# Patient Record
Sex: FEMALE | Race: WHITE | NOT HISPANIC OR LATINO | Employment: FULL TIME | URBAN - METROPOLITAN AREA
[De-identification: names, ages, dates, MRNs, and addresses within clinical notes are randomized per-mention and may not be internally consistent; named-entity substitution may affect disease eponyms.]

---

## 2024-08-28 ENCOUNTER — APPOINTMENT (OUTPATIENT)
Dept: CARDIOLOGY | Facility: CLINIC | Age: 59
End: 2024-08-28
Attending: HOSPITALIST
Payer: COMMERCIAL

## 2024-08-28 ENCOUNTER — HOSPITAL ENCOUNTER (OUTPATIENT)
Facility: CLINIC | Age: 59
Setting detail: OBSERVATION
Discharge: HOME | End: 2024-08-29
Attending: EMERGENCY MEDICINE | Admitting: EMERGENCY MEDICINE
Payer: COMMERCIAL

## 2024-08-28 ENCOUNTER — APPOINTMENT (OUTPATIENT)
Dept: ULTRASOUND IMAGING | Facility: CLINIC | Age: 59
End: 2024-08-28
Attending: HOSPITALIST
Payer: COMMERCIAL

## 2024-08-28 ENCOUNTER — APPOINTMENT (OUTPATIENT)
Dept: CT IMAGING | Facility: CLINIC | Age: 59
End: 2024-08-28
Attending: EMERGENCY MEDICINE
Payer: COMMERCIAL

## 2024-08-28 DIAGNOSIS — I26.94 MULTIPLE SUBSEGMENTAL PULMONARY EMBOLI WITHOUT ACUTE COR PULMONALE (H): ICD-10-CM

## 2024-08-28 LAB
ANION GAP SERPL CALCULATED.3IONS-SCNC: 13 MMOL/L (ref 7–15)
ATRIAL RATE - MUSE: 81 BPM
BASOPHILS # BLD AUTO: 0 10E3/UL (ref 0–0.2)
BASOPHILS NFR BLD AUTO: 1 %
BUN SERPL-MCNC: 15.3 MG/DL (ref 8–23)
CALCIUM SERPL-MCNC: 9.4 MG/DL (ref 8.8–10.4)
CHLORIDE SERPL-SCNC: 101 MMOL/L (ref 98–107)
CREAT SERPL-MCNC: 1.2 MG/DL (ref 0.51–0.95)
D DIMER PPP FEU-MCNC: 3.95 UG/ML FEU (ref 0–0.5)
DIASTOLIC BLOOD PRESSURE - MUSE: NORMAL MMHG
EGFRCR SERPLBLD CKD-EPI 2021: 51 ML/MIN/1.73M2
EOSINOPHIL # BLD AUTO: 0.2 10E3/UL (ref 0–0.7)
EOSINOPHIL NFR BLD AUTO: 4 %
ERYTHROCYTE [DISTWIDTH] IN BLOOD BY AUTOMATED COUNT: 12.3 % (ref 10–15)
FLUAV RNA SPEC QL NAA+PROBE: NEGATIVE
FLUBV RNA RESP QL NAA+PROBE: NEGATIVE
GLUCOSE SERPL-MCNC: 112 MG/DL (ref 70–99)
HCO3 SERPL-SCNC: 25 MMOL/L (ref 22–29)
HCT VFR BLD AUTO: 44.7 % (ref 35–47)
HGB BLD-MCNC: 15 G/DL (ref 11.7–15.7)
IMM GRANULOCYTES # BLD: 0 10E3/UL
IMM GRANULOCYTES NFR BLD: 0 %
INTERPRETATION ECG - MUSE: NORMAL
LVEF ECHO: NORMAL
LYMPHOCYTES # BLD AUTO: 1.3 10E3/UL (ref 0.8–5.3)
LYMPHOCYTES NFR BLD AUTO: 20 %
MCH RBC QN AUTO: 30.7 PG (ref 26.5–33)
MCHC RBC AUTO-ENTMCNC: 33.6 G/DL (ref 31.5–36.5)
MCV RBC AUTO: 91 FL (ref 78–100)
MONOCYTES # BLD AUTO: 0.6 10E3/UL (ref 0–1.3)
MONOCYTES NFR BLD AUTO: 9 %
NEUTROPHILS # BLD AUTO: 4.4 10E3/UL (ref 1.6–8.3)
NEUTROPHILS NFR BLD AUTO: 67 %
NRBC # BLD AUTO: 0 10E3/UL
NRBC BLD AUTO-RTO: 0 /100
NT-PROBNP SERPL-MCNC: 121 PG/ML (ref 0–900)
P AXIS - MUSE: 49 DEGREES
PLATELET # BLD AUTO: 152 10E3/UL (ref 150–450)
POTASSIUM SERPL-SCNC: 3.4 MMOL/L (ref 3.4–5.3)
PR INTERVAL - MUSE: 148 MS
QRS DURATION - MUSE: 90 MS
QT - MUSE: 410 MS
QTC - MUSE: 476 MS
R AXIS - MUSE: -5 DEGREES
RADIOLOGIST FLAGS: ABNORMAL
RBC # BLD AUTO: 4.89 10E6/UL (ref 3.8–5.2)
RSV RNA SPEC NAA+PROBE: NEGATIVE
SARS-COV-2 RNA RESP QL NAA+PROBE: NEGATIVE
SODIUM SERPL-SCNC: 139 MMOL/L (ref 135–145)
SYSTOLIC BLOOD PRESSURE - MUSE: NORMAL MMHG
T AXIS - MUSE: 1 DEGREES
TROPONIN T SERPL HS-MCNC: 8 NG/L
UFH PPP CHRO-ACNC: 0.9 IU/ML
VENTRICULAR RATE- MUSE: 81 BPM
WBC # BLD AUTO: 6.6 10E3/UL (ref 4–11)

## 2024-08-28 PROCEDURE — 250N000011 HC RX IP 250 OP 636: Performed by: EMERGENCY MEDICINE

## 2024-08-28 PROCEDURE — 99223 1ST HOSP IP/OBS HIGH 75: CPT | Performed by: HOSPITALIST

## 2024-08-28 PROCEDURE — 36415 COLL VENOUS BLD VENIPUNCTURE: CPT | Performed by: HOSPITALIST

## 2024-08-28 PROCEDURE — 96365 THER/PROPH/DIAG IV INF INIT: CPT

## 2024-08-28 PROCEDURE — 71275 CT ANGIOGRAPHY CHEST: CPT

## 2024-08-28 PROCEDURE — G0378 HOSPITAL OBSERVATION PER HR: HCPCS

## 2024-08-28 PROCEDURE — 93970 EXTREMITY STUDY: CPT

## 2024-08-28 PROCEDURE — 85379 FIBRIN DEGRADATION QUANT: CPT | Performed by: EMERGENCY MEDICINE

## 2024-08-28 PROCEDURE — 36415 COLL VENOUS BLD VENIPUNCTURE: CPT | Performed by: EMERGENCY MEDICINE

## 2024-08-28 PROCEDURE — 96366 THER/PROPH/DIAG IV INF ADDON: CPT

## 2024-08-28 PROCEDURE — C8929 TTE W OR WO FOL WCON,DOPPLER: HCPCS

## 2024-08-28 PROCEDURE — 250N000011 HC RX IP 250 OP 636: Performed by: HOSPITALIST

## 2024-08-28 PROCEDURE — 99285 EMERGENCY DEPT VISIT HI MDM: CPT | Mod: 25

## 2024-08-28 PROCEDURE — 83880 ASSAY OF NATRIURETIC PEPTIDE: CPT | Performed by: EMERGENCY MEDICINE

## 2024-08-28 PROCEDURE — 93005 ELECTROCARDIOGRAM TRACING: CPT

## 2024-08-28 PROCEDURE — 85025 COMPLETE CBC W/AUTO DIFF WBC: CPT | Performed by: EMERGENCY MEDICINE

## 2024-08-28 PROCEDURE — 80048 BASIC METABOLIC PNL TOTAL CA: CPT | Performed by: EMERGENCY MEDICINE

## 2024-08-28 PROCEDURE — 93306 TTE W/DOPPLER COMPLETE: CPT | Mod: 26 | Performed by: INTERNAL MEDICINE

## 2024-08-28 PROCEDURE — 250N000009 HC RX 250: Performed by: EMERGENCY MEDICINE

## 2024-08-28 PROCEDURE — 84484 ASSAY OF TROPONIN QUANT: CPT | Performed by: EMERGENCY MEDICINE

## 2024-08-28 PROCEDURE — 255N000002 HC RX 255 OP 636: Performed by: HOSPITALIST

## 2024-08-28 PROCEDURE — 999N000208 ECHOCARDIOGRAM COMPLETE

## 2024-08-28 PROCEDURE — 85520 HEPARIN ASSAY: CPT | Mod: 91 | Performed by: HOSPITALIST

## 2024-08-28 PROCEDURE — 87637 SARSCOV2&INF A&B&RSV AMP PRB: CPT | Performed by: EMERGENCY MEDICINE

## 2024-08-28 RX ORDER — HEPARIN SODIUM 10000 [USP'U]/100ML
0-5000 INJECTION, SOLUTION INTRAVENOUS CONTINUOUS
Status: DISCONTINUED | OUTPATIENT
Start: 2024-08-28 | End: 2024-08-29

## 2024-08-28 RX ORDER — ONDANSETRON 4 MG/1
4 TABLET, ORALLY DISINTEGRATING ORAL EVERY 6 HOURS PRN
Status: DISCONTINUED | OUTPATIENT
Start: 2024-08-28 | End: 2024-08-29 | Stop reason: HOSPADM

## 2024-08-28 RX ORDER — ACETAMINOPHEN 650 MG/1
650 SUPPOSITORY RECTAL EVERY 4 HOURS PRN
Status: DISCONTINUED | OUTPATIENT
Start: 2024-08-28 | End: 2024-08-29 | Stop reason: HOSPADM

## 2024-08-28 RX ORDER — HYDROCHLOROTHIAZIDE 25 MG/1
25 TABLET ORAL DAILY
Status: DISCONTINUED | OUTPATIENT
Start: 2024-08-28 | End: 2024-08-29 | Stop reason: HOSPADM

## 2024-08-28 RX ORDER — AMOXICILLIN 250 MG
1 CAPSULE ORAL 2 TIMES DAILY
Status: DISCONTINUED | OUTPATIENT
Start: 2024-08-28 | End: 2024-08-29 | Stop reason: HOSPADM

## 2024-08-28 RX ORDER — AMOXICILLIN 250 MG
2 CAPSULE ORAL 2 TIMES DAILY PRN
Status: DISCONTINUED | OUTPATIENT
Start: 2024-08-28 | End: 2024-08-29 | Stop reason: HOSPADM

## 2024-08-28 RX ORDER — AMOXICILLIN 250 MG
2 CAPSULE ORAL 2 TIMES DAILY
Status: DISCONTINUED | OUTPATIENT
Start: 2024-08-28 | End: 2024-08-29 | Stop reason: HOSPADM

## 2024-08-28 RX ORDER — IOPAMIDOL 755 MG/ML
81 INJECTION, SOLUTION INTRAVASCULAR ONCE
Status: COMPLETED | OUTPATIENT
Start: 2024-08-28 | End: 2024-08-28

## 2024-08-28 RX ORDER — MULTIVITAMIN,THERAPEUTIC
1 TABLET ORAL DAILY
COMMUNITY

## 2024-08-28 RX ORDER — ACETAMINOPHEN 325 MG/1
650 TABLET ORAL EVERY 4 HOURS PRN
Status: DISCONTINUED | OUTPATIENT
Start: 2024-08-28 | End: 2024-08-29 | Stop reason: HOSPADM

## 2024-08-28 RX ORDER — FLUOXETINE 40 MG/1
40 CAPSULE ORAL DAILY
COMMUNITY

## 2024-08-28 RX ORDER — LORATADINE 10 MG/1
10 TABLET ORAL 2 TIMES DAILY PRN
COMMUNITY

## 2024-08-28 RX ORDER — HYDROCHLOROTHIAZIDE 25 MG/1
25 TABLET ORAL DAILY
Status: ON HOLD | COMMUNITY
End: 2024-08-29

## 2024-08-28 RX ORDER — ONDANSETRON 2 MG/ML
4 INJECTION INTRAMUSCULAR; INTRAVENOUS EVERY 6 HOURS PRN
Status: DISCONTINUED | OUTPATIENT
Start: 2024-08-28 | End: 2024-08-29 | Stop reason: HOSPADM

## 2024-08-28 RX ORDER — AMOXICILLIN 250 MG
1 CAPSULE ORAL 2 TIMES DAILY PRN
Status: DISCONTINUED | OUTPATIENT
Start: 2024-08-28 | End: 2024-08-29 | Stop reason: HOSPADM

## 2024-08-28 RX ADMIN — HUMAN ALBUMIN MICROSPHERES AND PERFLUTREN 9 ML: 10; .22 INJECTION, SOLUTION INTRAVENOUS at 14:55

## 2024-08-28 RX ADMIN — HEPARIN SODIUM 1700 UNITS/HR: 10000 INJECTION, SOLUTION INTRAVENOUS at 21:36

## 2024-08-28 RX ADMIN — SODIUM CHLORIDE 100 ML: 9 INJECTION, SOLUTION INTRAVENOUS at 08:22

## 2024-08-28 RX ADMIN — HEPARIN SODIUM 1800 UNITS/HR: 10000 INJECTION, SOLUTION INTRAVENOUS at 09:10

## 2024-08-28 RX ADMIN — IOPAMIDOL 81 ML: 755 INJECTION, SOLUTION INTRAVENOUS at 08:22

## 2024-08-28 ASSESSMENT — ACTIVITIES OF DAILY LIVING (ADL)
ADLS_ACUITY_SCORE: 31
ADLS_ACUITY_SCORE: 31
ADLS_ACUITY_SCORE: 35
ADLS_ACUITY_SCORE: 31
ADLS_ACUITY_SCORE: 35
ADLS_ACUITY_SCORE: 31
ADLS_ACUITY_SCORE: 35
ADLS_ACUITY_SCORE: 35
ADLS_ACUITY_SCORE: 31
ADLS_ACUITY_SCORE: 31
ADLS_ACUITY_SCORE: 35
ADLS_ACUITY_SCORE: 31

## 2024-08-28 NOTE — PROGRESS NOTES
RECEIVING UNIT ED HANDOFF REVIEW    ED Nurse Handoff Report was reviewed by: Marissa Harris RN on August 28, 2024 at 10:54 AM

## 2024-08-28 NOTE — PROGRESS NOTES
Observation goals  PRIOR TO DISCHARGE        Comments: -diagnostic tests and consults completed and resulted Not Met  -vital signs normal or at patient baseline Not Met: BP elevated  Nurse to notify provider when observation goals have been met and patient is ready for discharge.

## 2024-08-28 NOTE — ED TRIAGE NOTES
Shortness of breath over the last 4-5 days, worse with exertion. Denies cough/fever. No one sick at home. PMH of asthma as a child.       Triage Assessment (Adult)       Row Name 08/28/24 0721          Triage Assessment    Airway WDL WDL        Cognitive/Neuro/Behavioral WDL    Cognitive/Neuro/Behavioral WDL WDL

## 2024-08-28 NOTE — CONSULTS
Patient has BCBS through an employer with $10,000 deductible.    Xarelto/Eliquis:  $565/mo. Upon receipt of RX Discharge Pharmacy can provide copay savings card from [employer] or [employer], respectively, to reduce this to $10/mo.    Annia Borrero  Pharmacy Technician/Liaison, Discharge Pharmacy   [phone] (voice or text)  [email or web address]

## 2024-08-28 NOTE — H&P
[facility]  History and Physical   Hospitalist  Guillermo Hawkins MD       Wendy Rutherford MRN# [id number]   YOB: 1965 Age: 59 year old      Date of Admission:  8/28/2024         Assessment and Plan:   Wendy Rutherford is a 59 year old female with PMH significant for morbid obesity, hypertension, CKD stage III, right Baker's cyst who presented to ED with dyspnea on exertion, noted with bilateral PE; admitted for observation 8/28/24.    Dyspnea on exertion secondary to bilateral pulmonary embolism  -progressively worsening dyspnea on exertion for past 4 days PTA  -hemodynamically stable; respiratory status stable on room air, not hypoxic  -normal troponin and BNP  -COVID/influenza/RSV negative  -EKG normal sinus rhythm with no acute ischemic changes  -D dimer elevated at 3.95  -CT chest noted with bilateral pulmonary embolism involving the distal aspect of the right main pulmonary artery, right-sided lobar pulmonary arteries, and multiple bilateral segmental and subsegmental pulmonary arteries. No CT evidence for right heart strain.    -Will admit for observation  -will continue with heparin drip initiated in ED  -will consult pharmacy liaison for DOAC coverage  -will obtain an echocardiogram    Right knee Baker's cyst  -she does have right lower extremity swelling with concern for DVT  -will obtain lower extremity ultrasound  -if positive for DVT , will consult orthopedics to see if this might be contributing to the DVT     Mild hypokalemia  CKD stage III  -creatinine 1.2 (baseline around 1 to 1.2); potassium 3.4  -will have potassium replacement protocol  -monitor BMP  -hold HCTZ for now    Hypertension  -will hold off on HCTZ for now given bilateral PE and can probably resume 8/29 if remains hemodynamically stable  -hydralazine IV PRN for SBP>180    Depression  -continue with PTA Prozac    Incidental pulmonary nodule  -CT chest also noted a few small pulmonary nodules measuring  "up to 5 mm. Consider  follow-up chest CT in 12 months.  -No family history of malignancy  -will have follow-up with PCP  .    Clinically Significant Risk Factors Present on Admission                            # Severe Obesity: Estimated body mass index is 44.1 kg/m  as calculated from the following:    Height as of this encounter: 1.651 m (5' 5\").    Weight as of this encounter: 120.2 kg (265 lb).             DVT prophylaxis: anticoagulation as above  Code status: full code    Care plan discussed with ED provider and patient           Primary Care Physician:   No Ref-Primary, Physician None         Chief Complaint:     Dyspnea on exertion    History is obtained from the patient         History of Present Illness:     Wendy Rutherford is a 59 year old female with PMH significant for morbid obesity, hypertension, CKD stage III, right Baker's cyst who presented to ED with dyspnea on exertion, noted with bilateral PE; admitted for observation 8/28/24.    She has been noticing progressively worsening dyspnea on exertion for past 4 days PTA. Denies any fever, chest pain or cough.     In the ED she was seen by Dr. Jimenez  -hemodynamically stable; respiratory status stable on room air, not hypoxic  -normal troponin and BNP  -COVID/influenza/RSV negative  -EKG normal sinus rhythm with no acute ischemic changes  -D dimer elevated at 3.95  -CT chest noted with bilateral pulmonary embolism involving the distal aspect of the right main pulmonary artery, right-sided lobar pulmonary arteries, and multiple bilateral segmental and subsegmental pulmonary arteries. No CT evidence for right heart strain.    She was started on heparin drip and hospitalist requested admission for further evaluation.    The patient denies any fever, chills, rigors, chest pain .  Denies pain abdomen.  No bowel or bladder disturbances.           Past Medical History:     morbid obesity  Hypertension  CKD stage III  right Baker's cyst           Past " "Surgical History:   No past surgical history on file.           Home Medications:     Prior to Admission Medications   Prescriptions Last Dose Informant Patient Reported? Taking?   FLUoxetine (PROZAC) 40 MG capsule 8/28/2024  Yes Yes   Sig: Take 40 mg by mouth daily.   MAGNESIUM PO 8/28/2024  Yes Yes   Sig: Take 1 tablet by mouth daily.   Turmeric (QC TUMERIC COMPLEX PO) 8/28/2024  Yes Yes   Sig: Take 1 tablet by mouth daily.   hydrochlorothiazide (HYDRODIURIL) 25 MG tablet 8/28/2024  Yes Yes   Sig: Take 25 mg by mouth daily.   loratadine (CLARITIN) 10 MG tablet 8/28/2024  Yes Yes   Sig: Take 10 mg by mouth 2 times daily as needed for allergies. Has been taking BID scheduled lately due to allergy flare   multivitamin, therapeutic (THERA-VIT) TABS tablet 8/28/2024  Yes Yes   Sig: Take 1 tablet by mouth daily.      Facility-Administered Medications: None            Allergies:     Allergies   Allergen Reactions    Sulfa Antibiotics Rash            Social History:   Wendy Rutherford                Family History:   Wendy Rutherford family history is not on file.    Family history was reviewed by myself and not pertinent to current presentation.           Review of Systems:   A10 point Review of Systems was done and were negative other than noted in the HPI.             Physical Exam:   Blood pressure (!) 147/84, pulse 73, temperature 98  F (36.7  C), temperature source Temporal, resp. rate 16, height 1.651 m (5' 5\"), weight 120.2 kg (265 lb), SpO2 94%.  265 lbs 0 oz        Constitutional: Alert, awake and oriented X 3; lying comfortably in bed in no apparent distress; morbidly obese   HEENT: Pupils equal and reactive to light and accomodation, EOMI intact; neck supple no raised JVD or rigidity    Oral cavity: Moist mucosa   Cardiovascular: Normal s1 s2, regular rate and rhythm, no murmur   Lungs: B/l clear to auscultation, no wheezes or crepitations   Abdomen: Soft, nt, nd, no guarding, rigidity or rebound; BS +   LE " : right knee Baker cyst with  LE edema +   Musculoskeletal: Power 5/5 in all extremities   Neuro: No focal neurological deficits noted, CN II to XII grossly intact   Psychiatry: normal mood and affect  Skin: No obvious skin rashes or ulcers             Data:   All new lab and imaging data was reviewed in Epic.   Significant labs and imagings include:    BMP notable for creatinine 1.2; normal troponin, normal BNP  elevated D dimer  COVID/influenza/RSV negative  EKG normal sinus rhythm, no acute ST-T changes  CT chest:  1.  Positive for pulmonary emboli involving the distal aspect of the  right main pulmonary artery, right-sided lobar pulmonary arteries, and  multiple bilateral segmental and subsegmental pulmonary arteries. No  CT evidence for right heart strain.  2.  Mild mosaic attenuation in the lungs may be due to chronic small  vessel to small airways disease.  3.  A few small pulmonary nodules measuring up to 5 mm. Consider  follow-up chest CT in 12 months.  4.  Linear filling defects in branches of the right superior pulmonary  vein may be due to thrombus or mixing artifact.             Guillermo Hawkins MD  Hospitalist

## 2024-08-28 NOTE — ED PROVIDER NOTES
"  Emergency Department Note      History of Present Illness     Chief Complaint   Shortness of Breath    HPI   Wendy Rutherford is a 59 year old female with a history of hypertension who presents for evaluation of shortness of breath. The patient reports that for the past four days, she has had increasing shortness of breath and dyspnea on exertion when doing her daily activities. States that her \"lungs aren't open\" and that she finds herself gasping for air. Endorses right lower extremity edema, mostly at the ankle. Denies history of blood clot or cancer and any recent long distance travel or immobilization. Denies fever, cough, and chest pain.    Independent Historian   None    Review of External Notes   I reviewed office visit from 3/29/24 for annual physical.     Past Medical History     Medical History and Problem List   Hypertension  Hyperlipidemia  Prediabetes  Depression  Renal insufficiency   Anxiety     Medications   Hydrochlorothiazide   Fluoxetine     Surgical History   T&A  D&C  Dental extraction  ANDI    Physical Exam     Patient Vitals for the past 24 hrs:   BP Temp Temp src Pulse Resp SpO2 Height Weight   08/28/24 1131 (!) 155/82 98  F (36.7  C) Oral 78 18 94 % -- 54.2 kg (119 lb 6.4 oz)   08/28/24 1116 -- -- -- -- -- 94 % -- --   08/28/24 1115 139/83 -- -- 74 -- -- -- --   08/28/24 0933 -- -- -- -- -- 94 % -- --   08/28/24 0832 (!) 147/84 -- -- 73 -- 94 % -- --   08/28/24 0741 -- -- -- -- -- 96 % -- --   08/28/24 0718 (!) 147/70 98  F (36.7  C) Temporal 80 16 96 % 1.651 m (5' 5\") 120.2 kg (265 lb)     Physical Exam  GENERAL: well developed, pleasant  HEAD: atraumatic  EYES: pupils reactive, extraocular muscles intact, conjunctivae normal  ENT:  mucus membranes moist  NECK:  trachea midline, normal range of motion  RESPIRATORY: Mildly dyspneic, no tachypnea, breath sounds clear to auscultation   CVS: normal S1/S2, no murmurs, intact distal pulses  ABDOMEN: soft, nontender, " nondistention  MUSCULOSKELETAL: no deformities, right lower extremity edema  SKIN: warm and dry, no acute rashes or ulceration  NEURO: GCS 15, cranial nerves intact, alert and oriented x3  PSYCH:  Mood/affect normal    Diagnostics     Lab Results   Labs Ordered and Resulted from Time of ED Arrival to Time of ED Departure   D DIMER QUANTITATIVE - Abnormal       Result Value    D-Dimer Quantitative 3.95 (*)    BASIC METABOLIC PANEL - Abnormal    Sodium 139      Potassium 3.4      Chloride 101      Carbon Dioxide (CO2) 25      Anion Gap 13      Urea Nitrogen 15.3      Creatinine 1.20 (*)     GFR Estimate 51 (*)     Calcium 9.4      Glucose 112 (*)    TROPONIN T, HIGH SENSITIVITY - Normal    Troponin T, High Sensitivity 8     NT PROBNP INPATIENT - Normal    N terminal Pro BNP Inpatient 121     INFLUENZA A/B, RSV, & SARS-COV2 PCR - Normal    Influenza A PCR Negative      Influenza B PCR Negative      RSV PCR Negative      SARS CoV2 PCR Negative     CBC WITH PLATELETS AND DIFFERENTIAL    WBC Count 6.6      RBC Count 4.89      Hemoglobin 15.0      Hematocrit 44.7      MCV 91      MCH 30.7      MCHC 33.6      RDW 12.3      Platelet Count 152      % Neutrophils 67      % Lymphocytes 20      % Monocytes 9      % Eosinophils 4      % Basophils 1      % Immature Granulocytes 0      NRBCs per 100 WBC 0      Absolute Neutrophils 4.4      Absolute Lymphocytes 1.3      Absolute Monocytes 0.6      Absolute Eosinophils 0.2      Absolute Basophils 0.0      Absolute Immature Granulocytes 0.0      Absolute NRBCs 0.0         Imaging   US Lower Extremity Venous Duplex Bilateral   Final Result   IMPRESSION:   1. Negative for DVT throughout both lower extremities.   2. Bilateral Estrella's cysts.      JOSE C ARVIZU MD            SYSTEM ID:  [id number]      CT Chest Pulmonary Embolism w Contrast   Final Result   Abnormal   IMPRESSION:   1.  Positive for pulmonary emboli involving the distal aspect of the   right main pulmonary artery,  right-sided lobar pulmonary arteries, and   multiple bilateral segmental and subsegmental pulmonary arteries. No   CT evidence for right heart strain.   2.  Mild mosaic attenuation in the lungs may be due to chronic small   vessel to small airways disease.   3.  A few small pulmonary nodules measuring up to 5 mm. Consider   follow-up chest CT in 12 months.   4.  Linear filling defects in branches of the right superior pulmonary   vein may be due to thrombus or mixing artifact.      [Critical Result: Pulmonary embolism]      Finding was identified on 8/28/2024 8:41 AM.       Dr. Jimenez was contacted by me on 8/28/2024 8:50 AM and verbalized   understanding of the critical result.        MARILYN SANTIAGO MD            SYSTEM ID:  [id number]      Echocardiogram Complete    (Results Pending)     EKG   ECG taken at 0741, ECG read at 0747  Normal sinus rhythm  Minimal voltage criteria for LVH, may be normal variant (Huber product)  Inferior infarct, age undetermined    Rate 81 bpm. NH interval 148 ms. QRS duration 90 ms. QT/QTc 410/476 ms. P-R-T axes 49 -5 1.    Independent Interpretation   None    ED Course      Medications Administered   Medications   heparin 25,000 units in 0.45% NaCl 250 mL ANTICOAGULANT infusion (1,800 Units/hr Intravenous $New Bag 8/28/24 0910)   FLUoxetine (PROzac) capsule 40 mg (has no administration in time range)   hydrochlorothiazide (HYDRODIURIL) tablet 25 mg ( Oral Automatically Held 8/31/24 0800)   senna-docusate (SENOKOT-S/PERICOLACE) 8.6-50 MG per tablet 1 tablet (has no administration in time range)     Or   senna-docusate (SENOKOT-S/PERICOLACE) 8.6-50 MG per tablet 2 tablet (has no administration in time range)   ondansetron (ZOFRAN ODT) ODT tab 4 mg (has no administration in time range)     Or   ondansetron (ZOFRAN) injection 4 mg (has no administration in time range)   Patient is already receiving anticoagulation with heparin, enoxaparin (LOVENOX), warfarin (COUMADIN)  or other  anticoagulant medication (has no administration in time range)   acetaminophen (TYLENOL) tablet 650 mg (has no administration in time range)     Or   acetaminophen (TYLENOL) Suppository 650 mg (has no administration in time range)   senna-docusate (SENOKOT-S/PERICOLACE) 8.6-50 MG per tablet 1 tablet (1 tablet Oral Not Given 8/28/24 1228)     Or   senna-docusate (SENOKOT-S/PERICOLACE) 8.6-50 MG per tablet 2 tablet ( Oral See Alternative 8/28/24 1228)   iopamidol (ISOVUE-370) solution 81 mL (81 mLs Intravenous $Given 8/28/24 0822)   SalineFlush (100 mLs Intravenous $Given 8/28/24 0822)   heparin ANTICOAGULANT loading dose for  HIGH INTENSITY TREATMENT* Give BEFORE starting heparin infusion (8,000 Units Intravenous $Given 8/28/24 0910)     Procedures   Procedures     Discussion of Management   Admitting Hospitalist, Dr. Hawkins    ED Course   ED Course as of 08/28/24 1345   Wed Aug 28, 2024   0728 I obtained history and examined the patient as noted above.    0851 I spoke with radiology regarding CT PE study.   0852 I rechecked and updated the patient.    0857 I spoke with Dr. Hawkins of the hospitalist service regarding admission.        Additional Documentation  None    Medical Decision Making / Diagnosis     CMS Diagnoses: None    MIPS    CT for PE was ordered because the patient had an abnormal d-dimer.    EDNA Rutherford is a 59 year old female presents with shortness of breath.  Consider differential including asthma, cardiac, pulmonary, malignancy, infectious etiology amongst others.  Pulmonary exam is fairly clear without significant wheezing.  She looks awfully dyspneic just from walking back from triage to the room.  D-dimer is elevated and CT shows moderate to significant clot burden and PE.  No evidence of right heart strain and no proximal clot that would benefit thrombectomy.  She has had some leg swelling more on the right than on the left, certainly DVT is considered.  Patient was  anticoagulated and spoke with the hospitalist regarding admission.      Disposition   The patient was admitted to the hospital.     Diagnosis     ICD-10-CM    1. Multiple subsegmental pulmonary emboli without acute cor pulmonale (H)  I26.94          Scribe Disclosure:  ISheron, am serving as a scribe at 7:32 AM on 8/28/2024 to document services personally performed by See Jimenez MD based on my observations and the provider's statements to me.        See Jimenez MD  08/28/24 8772

## 2024-08-28 NOTE — PLAN OF CARE
PRIMARY Concern: CORBETT, MARLI PE's  SAFETY RISK Concerns (fall risk, behaviors, etc.): none      Isolation/Type: n/a  Tests/Procedures for NEXT shift: Echo  Consults? (Pending/following, signed-off?) n/a  Where is patient from? (Home, TCU, etc.): home  Other Important info for NEXT shift: heparin drip, hepXa checks  Anticipated DC date & active delays: TBD  _____________________________________________________________________________  SUMMARY NOTE:  Orientation/Cognitive: A&Ox4  Observation Goals (Met/ Not Met): Not Met  Mobility Level/Assist Equipment: Independent  Antibiotics & Plan (IV/po, length of tx left): n/a  Pain Management: denies pain  Tele/VS/O2: pt mildly hypertensive, all other VSS on RA  ABNL Lab/BG: Creat 1.20, hepXa around 1500  Diet: tolerating a regular diet  Bowel/Bladder: WDL  Skin Concerns: WDL  Drains/Devices: PIV infusing heparin@1800units/hr  Patient Stated Goal for Today: rest

## 2024-08-28 NOTE — PHARMACY-ADMISSION MEDICATION HISTORY
Pharmacist Admission Medication History    Admission medication history is complete. The information provided in this note is only as accurate as the sources available at the time of the update.    Information Source(s): Patient and CareEverywhere/SureScripts via in-person    Changes made to PTA medication list:  Added: all meds  Deleted: None  Changed: None    Allergies reviewed with patient and updates made in EHR: no    Medication History Completed By: Maude Poon RPH 8/28/2024 8:59 AM    PTA Med List   Medication Sig Last Dose    FLUoxetine (PROZAC) 40 MG capsule Take 40 mg by mouth daily. 8/28/2024    hydrochlorothiazide (HYDRODIURIL) 25 MG tablet Take 25 mg by mouth daily. 8/28/2024    loratadine (CLARITIN) 10 MG tablet Take 10 mg by mouth 2 times daily as needed for allergies. Has been taking BID scheduled lately due to allergy flare 8/28/2024    MAGNESIUM PO Take 1 tablet by mouth daily. 8/28/2024    multivitamin, therapeutic (THERA-VIT) TABS tablet Take 1 tablet by mouth daily. 8/28/2024    Turmeric (QC TUMERIC COMPLEX PO) Take 1 tablet by mouth daily. 8/28/2024

## 2024-08-28 NOTE — ED NOTES
"[facility]  ED Nurse Handoff Report    ED Chief complaint: Shortness of Breath      ED Diagnosis:   Final diagnoses:   Multiple subsegmental pulmonary emboli without acute cor pulmonale (H)       Code Status: Not on file    Allergies:   Allergies   Allergen Reactions    Sulfa Antibiotics Rash       Patient Story: Pt presents to the ED with 4-5 days of SOB that is worse with activity.  Focused Assessment:  Pt reports SOB that is worse with activity for the last 4-5 days. Pt O2 sats in the mid to low 90s on room air, other vital signs unremarkable.  D-dimer elevated, CT scan shows   Positive for pulmonary emboli involving the distal aspect of the right main pulmonary artery, right-sided lobar pulmonary arteries, and multiple bilateral segmental and subsegmental pulmonary arteries. No CT evidence for right heart strain.\"  Pt stated on heparin drip.  Pt alert and oriented x4 and cooperative with cares, plans for admission.    Treatments and/or interventions provided: See above  Patient's response to treatments and/or interventions: See above    To be done/followed up on inpatient unit:  NA    Does this patient have any cognitive concerns?:  None    Activity level - Baseline/Home:  Independent  Activity Level - Current:   Independent    Patient's Preferred language: English   Needed?: No    Isolation: None  Infection: Not Applicable  Patient tested for COVID 19 prior to admission: YES, negative  Bariatric?: No    Vital Signs:   Vitals:    08/28/24 0718 08/28/24 0741 08/28/24 0832   BP: (!) 147/70  (!) 147/84   Pulse: 80  73   Resp: 16     Temp: 98  F (36.7  C)     TempSrc: Temporal     SpO2: 96% 96% 94%   Weight: 120.2 kg (265 lb)     Height: 1.651 m (5' 5\")         Cardiac Rhythm:     Was the PSS-3 completed:   Yes  What interventions are required if any?               Family Comments: None  OBS brochure/video discussed/provided to patient/family: NA              Name of person given brochure " if not patient: NA              Relationship to patient: NA    For the majority of the shift this patient's behavior was Green.   Behavioral interventions performed were NA.    ED NURSE PHONE NUMBER: [phone]

## 2024-08-29 VITALS
SYSTOLIC BLOOD PRESSURE: 151 MMHG | DIASTOLIC BLOOD PRESSURE: 70 MMHG | TEMPERATURE: 97.3 F | HEART RATE: 73 BPM | RESPIRATION RATE: 16 BRPM | WEIGHT: 119.4 LBS | BODY MASS INDEX: 19.89 KG/M2 | HEIGHT: 65 IN | OXYGEN SATURATION: 94 %

## 2024-08-29 LAB
ANION GAP SERPL CALCULATED.3IONS-SCNC: 11 MMOL/L (ref 7–15)
BUN SERPL-MCNC: 12.6 MG/DL (ref 8–23)
CALCIUM SERPL-MCNC: 9.2 MG/DL (ref 8.8–10.4)
CHLORIDE SERPL-SCNC: 102 MMOL/L (ref 98–107)
CREAT SERPL-MCNC: 1.13 MG/DL (ref 0.51–0.95)
EGFRCR SERPLBLD CKD-EPI 2021: 55 ML/MIN/1.73M2
GLUCOSE SERPL-MCNC: 104 MG/DL (ref 70–99)
HCO3 SERPL-SCNC: 25 MMOL/L (ref 22–29)
POTASSIUM SERPL-SCNC: 3.7 MMOL/L (ref 3.4–5.3)
POTASSIUM SERPL-SCNC: 3.7 MMOL/L (ref 3.4–5.3)
SODIUM SERPL-SCNC: 138 MMOL/L (ref 135–145)
UFH PPP CHRO-ACNC: 0.55 IU/ML

## 2024-08-29 PROCEDURE — G0378 HOSPITAL OBSERVATION PER HR: HCPCS

## 2024-08-29 PROCEDURE — 250N000013 HC RX MED GY IP 250 OP 250 PS 637: Performed by: HOSPITALIST

## 2024-08-29 PROCEDURE — 250N000013 HC RX MED GY IP 250 OP 250 PS 637: Performed by: NURSE PRACTITIONER

## 2024-08-29 PROCEDURE — 84132 ASSAY OF SERUM POTASSIUM: CPT | Performed by: HOSPITALIST

## 2024-08-29 PROCEDURE — 96366 THER/PROPH/DIAG IV INF ADDON: CPT

## 2024-08-29 PROCEDURE — 36415 COLL VENOUS BLD VENIPUNCTURE: CPT | Performed by: HOSPITALIST

## 2024-08-29 PROCEDURE — 80048 BASIC METABOLIC PNL TOTAL CA: CPT | Performed by: NURSE PRACTITIONER

## 2024-08-29 PROCEDURE — 99239 HOSP IP/OBS DSCHRG MGMT >30: CPT | Performed by: NURSE PRACTITIONER

## 2024-08-29 RX ADMIN — APIXABAN 10 MG: 5 TABLET, FILM COATED ORAL at 10:33

## 2024-08-29 RX ADMIN — FLUOXETINE 40 MG: 20 CAPSULE ORAL at 08:04

## 2024-08-29 ASSESSMENT — ACTIVITIES OF DAILY LIVING (ADL)
ADLS_ACUITY_SCORE: 31

## 2024-08-29 NOTE — DISCHARGE SUMMARY
[facility]  Hospitalist Discharge Summary      Date of Admission:  8/28/2024  Date of Discharge:  8/29/2024  Discharging Provider: Juliet Escalante  Discharge Service: Hospitalist Service    Discharge Diagnoses   Dyspnea on exertion secondary to bilateral pulmonary embolism   Right knee Baker's cyst   Mild hypokalemia  CKD stage III  Hypertension   Depression   Incidental pulmonary nodule     Follow-ups Needed After Discharge   PCP within 7 days for post hospitalization - BP check and review if need to restart HTCZ.  Heme/Onc outpatient referral  Recommend CT Chest in 12 month for pulmonary nodule.    Discharge Disposition   Discharged to home  Condition at discharge: Stable    Hospital Course   Wendy Rutherford is a 59 year old female with PMH significant for morbid obesity, hypertension, CKD stage III, right Baker's cyst who presented to ED with increasing symptom of dyspnea on exertion, noted with bilateral PE and elevated D dimer; started on heparin drip and admitted for observation.    8/28 CT chest noted bilateral pulmonary embolism involving the distal aspect of the right main pulmonary artery, right-sided lobar pulmonary arteries, and multiple bilateral segmental and subsegmental pulmonary arteries. No CT evidence for right heart strain.  ECG NSR.  U/S LE Venous Duplex Bilaterally negative for DVT and showed findings of bilateral Baker's cyst which has been stable during admission.   Endorses symptom of dyspnea on exertion has improved.  Cr 8/28 1.20 and improved 8/29 1.13.  With concern for decreased kidney function and borderline hypokalemia, discontinued hydrochlorothiazide and BPs have been stable ~135/85.  Mild hypokalemia and improved.  Incidental finding of few small pulmonary nodules on Chest CT measuring up to 5 mm.  Endorses FH+ breast cancer with paternal Aunt.  FH+ of pulmonary embolisms (dad?, brother, and aunt).  No known FH of clotting disorders.  No known personal h/o  cancers.    While admitted, patient was on heparin gtt and now de-escalated to oral eliquis. Discussed concerns regarding possible clotting disorder, hem/onc referral made. Patient upon discharge day is alert, oriented, no distress noted. She has remained without any oxygen needs and will discharge home. She will continue on eliquis regimen, understands to follow up with PCP and hem/onc. She is aware of pulmonary nodule, this information will need to be followed by PCP.     Dyspnea on exertion secondary to bilateral pulmonary embolism  -Discharged with Eliquis 10 mg tablets BID PO x 14 days followed by Eliquis 5 mg tablet BID PO every day thereafter.    Right knee Baker's cyst  - Stable.  Encourage follow-up with primary care provider if concerns.     Mild hypokalemia, resolved  CKD stage III  - Recommend to stop hydrochlorothiazide due to elevated Cr which is slowly improving.  Defer to primary care provider if need to restart at post-hospital follow-up.     Hypertension  - Recommend to stop hydrochlorothiazide due to elevated Cr which is slowly improving.  Defer to primary care provider if need to restart at post-hospital follow-up.     Depression  - Continue with PTA Prozac.     Incidental pulmonary nodule  -CT chest also noted a few small pulmonary nodules measuring up to 5 mm. Consider follow-up chest CT in 12 months. -Follow-up with PCP    Consultations This Hospital Stay   PHARMACY IP CONSULT  PHARMACY LIAISON FOR MEDICATION COVERAGE CONSULT    Code Status   Full Code    Time Spent on this Encounter   I, Juliet Escalante, personally saw the patient today and spent greater than 30 minutes discharging this patient.       Juliet SIERRA [facility] EXTENDED RECOVERY AND SHORT STAY  [address] [address]  Phone: [phone]    Patient was seen in conjunct with Juliet REINOSO and I agree with evaluation and discharge disposition.     Nan Buenrostro DNP APRN CNP  "  ______________________________________________________________________    Physical Exam   Vital Signs: Temp: 98.2  F (36.8  C) Temp src: Oral BP: 134/72 Pulse: 71   Resp: 16 SpO2: 95 % O2 Device: None (Room air)    Weight: 119 lbs 6.4 oz  Constitutional: awake, alert, cooperative, no apparent distress, and appears stated age.  Eyes: Lids and lashes normal, pupils equal, round.  Extra ocular muscles intact, sclera clear, conjunctiva normal  Respiratory: No increased work of breathing, good air exchange, clear to auscultation bilaterally, mild wheezing.  Cardiovascular: Normal apical impulse, regular rate and rhythm, normal S1 and S2. No murmur noted  GI: Normal bowel sounds, soft, non-distended, non-tender.    Skin: no bruising or bleeding.  Popliteal R-knee baker's cyst ~ 1x2\" without erythema, edema, or fluctuance.  Musculoskeletal: There is no redness, warmth, or swelling of the joints. Motor strength is 5 out of 5 all extremities bilaterally.  Tone is normal.    Lower extremities: No edema, pedal pulses present bilaterally.    Primary Care Physician   Physician No Ref-Primary    Discharge Orders   Eliquis Rx  Referral to heme/onc  Hold HCTZ    Significant Results and Procedures   Most Recent INR's and Anticoagulation Dosing History:  Anticoagulation Dose History           No data to display              Most Recent 3 Creatinines:  Recent Labs   Lab Test 08/29/24  0658 08/28/24  0738   CR 1.13* 1.20*     Most Recent D-dimer:  Recent Labs   Lab Test 08/28/24  0738   DD 3.95*   ,   Results for orders placed or performed during the hospital encounter of 08/28/24   CT Chest Pulmonary Embolism w Contrast     Value    Radiologist flags Pulmonary embolism (AA)    Narrative    CT CHEST PULMONARY EMBOLISM W CONTRAST 8/28/2024 8:40 AM    CLINICAL HISTORY: sob 4 days, elevated d dimer, no cancer history,  high suspicion  TECHNIQUE: CT angiogram chest during arterial phase injection IV  contrast. 2D and 3D MIP " reconstructions were performed by the CT  technologist. Dose reduction techniques were used.     CONTRAST: 81mL [id number]-[id number]    COMPARISON: Chest x-ray on 1/3/2006    FINDINGS:  ANGIOGRAM CHEST: Filling defects in the distal right main pulmonary  artery, multiple right-sided lobar pulmonary arteries, and multiple  bilateral segmental and subsegmental pulmonary arteries, consistent  with pulmonary emboli. Thoracic aorta is negative for dissection. No  CT evidence of right heart strain. Filling defects in branches of the  right superior pulmonary vein, may be due to thrombus or mixing  artifact.    LUNGS AND PLEURA: No infiltrate, pulmonary infarct, pleural effusion  or pneumothorax. Mosaic attenuation of the lungs, which may be due to  chronic small vessel small airways disease. Few small pulmonary  nodules. For example, there is a 5 mm right upper lobe (7/100) and 3  mm right middle lobe nodule (7/134).    MEDIASTINUM/AXILLAE: No lymphadenopathy. No thoracic aortic aneurysm.  Mild coronary artery calcifications. No pericardial effusion.    UPPER ABDOMEN: No significant finding.    MUSCULOSKELETAL: No concerning bone lesions.      Impression    IMPRESSION:  1.  Positive for pulmonary emboli involving the distal aspect of the  right main pulmonary artery, right-sided lobar pulmonary arteries, and  multiple bilateral segmental and subsegmental pulmonary arteries. No  CT evidence for right heart strain.  2.  Mild mosaic attenuation in the lungs may be due to chronic small  vessel to small airways disease.  3.  A few small pulmonary nodules measuring up to 5 mm. Consider  follow-up chest CT in 12 months.  4.  Linear filling defects in branches of the right superior pulmonary  vein may be due to thrombus or mixing artifact.    [Critical Result: Pulmonary embolism]    Finding was identified on 8/28/2024 8:41 AM.     Dr. Jimenez was contacted by me on 8/28/2024 8:50 AM and verbalized  understanding of the critical result.       MARILYN SANTIAGO MD         SYSTEM ID:  [id number]   US Lower Extremity Venous Duplex Bilateral    Narrative    VENOUS ULTRASOUND BILATERAL LOWER EXTREMITIES  2024 11:19 AM     HISTORY: Bilateral PE; History of right Baker's cyst; Rule out DVT.    COMPARISON: None.    TECHNIQUE: Color Doppler and spectral waveform analysis performed  throughout the deep veins of both lower extremities.    FINDINGS: Both common femoral, proximal great saphenous, femoral, and  popliteal veins demonstrate normal blood flow, compression, and  augmentation. Posterior tibial and peroneal veins are compressible.  Hypoechoic collection in the right popliteal fossa is 7.6 x 6.7 x 4.1  cm. No internal blood flow. An additional hypoechoic collection noted  left popliteal fossa is 5.2 x 3.6 x 1.7 cm. No internal blood flow.      Impression    IMPRESSION:  1. Negative for DVT throughout both lower extremities.  2. Bilateral Estrella's cysts.    JOSE C ARVIZU MD         SYSTEM ID:  [id number]   Echocardiogram Complete     Value    LVEF  60-65%    Narrative    [id number]  [id number]  [id number]  [id number]^GUSTABO^JOURDAN^SCOTT     [facility]  Echocardiography Laboratory  [address] [place] [address]     Name: OLVIN PERKINS  MRN: [id number]  : 1965  Study Date: 2024 02:30 PM  Age: 59 yrs  Gender: Female  Patient Location: [facility]  Reason For Study: Pulmonary Embolism  Ordering Physician: JOURDAN MONTEJO  Referring Physician: JOURDAN OMNTEJO  Performed By: Pauline Donald     BSA: 2.2 m2  Height: 65 in  Weight: 265 lb  HR: 73  BP: 139/83 mmHg  ______________________________________________________________________________  Procedure  Complete Echo Adult. Optison (NDC #[id number]) given intravenously.  ______________________________________________________________________________  Interpretation Summary     The left ventricle is normal in size.  The visual ejection fraction is 60-65%.  Diastolic  Doppler findings (E/E' ratio and/or other parameters) suggest left  ventricular filling pressures are indeterminate.  No regional wall motion abnormalities noted.  The right ventricle is normal in size and function.  No significant valvular heart disease.  ______________________________________________________________________________  Left Ventricle  The left ventricle is normal in size. There is normal left ventricular wall  thickness. Diastolic Doppler findings (E/E' ratio and/or other parameters)  suggest left ventricular filling pressures are indeterminate. The visual  ejection fraction is 60-65%. No regional wall motion abnormalities noted.     Right Ventricle  The right ventricle is normal in size and function.     Atria  Normal left atrial size. Right atrial size is normal. There is no color  Doppler evidence of an atrial shunt.     Mitral Valve  The mitral valve leaflets are mildly thickened. There is mild mitral annular  calcification. There is trace mitral regurgitation.     Tricuspid Valve  There is trace tricuspid regurgitation. IVC diameter and respiratory changes  fall into an intermediate range suggesting an RA pressure of 8 mmHg. Right  ventricular systolic pressure could not be approximated due to inadequate  tricuspid regurgitation.     Aortic Valve  There is trivial trileaflet aortic sclerosis. No aortic regurgitation is  present.     Pulmonic Valve  There is trace pulmonic valvular regurgitation.     Vessels  Normal size aorta. The aortic root is normal size.     Pericardium  There is no pericardial effusion.     Rhythm  Sinus rhythm was noted.  ______________________________________________________________________________  MMode/2D Measurements & Calculations  IVSd: 1.0 cm     LVIDd: 5.1 cm  LVIDs: 3.6 cm  LVPWd: 0.96 cm  FS: 29.0 %  LV mass(C)d: 181.9 grams  LV mass(C)dI: 81.6 grams/m2  Ao root diam: 3.1 cm  LA dimension: 3.3 cm  asc Aorta Diam: 3.6 cm  LA/Ao: 1.1  LVOT diam: 2.0 cm  LVOT area:  3.1 cm2  Ao root diam index Ht(cm/m): 1.9  Ao root diam index BSA (cm/m2): 1.4  Asc Ao diam index BSA (cm/m2): 1.6  Asc Ao diam index Ht(cm/m): 2.2  LA Volume (BP): 39.3 ml     LA Volume Index (BP): 17.6 ml/m2  RV Base: 2.6 cm  RWT: 0.38  TAPSE: 1.9 cm     Doppler Measurements & Calculations  MV E max matt: 73.3 cm/sec  MV A max matt: 101.0 cm/sec  MV E/A: 0.73  MV dec time: 0.24 sec  Ao V2 max: 190.0 cm/sec  Ao max P.0 mmHg  Ao V2 mean: 129.7 cm/sec  Ao mean P.7 mmHg  Ao V2 VTI: 36.5 cm  SARAH(I,D): 1.7 cm2  SARAH(V,D): 1.7 cm2  LV V1 max P.4 mmHg  LV V1 max: 105.0 cm/sec  LV V1 VTI: 20.5 cm  SV(LVOT): 63.2 ml  SI(LVOT): 28.4 ml/m2  PA acc time: 0.12 sec  AV Matt Ratio (DI): 0.55  SARAH Index (cm2/m2): 0.78  E/E' avg: 10.4     Lateral E/e': 7.0  Medial E/e': 13.8  RV S Matt: 10.3 cm/sec     ______________________________________________________________________________  Report approved by: Arnoldo Perkins 2024 03:49 PM             Discharge Medications   Current Discharge Medication List        START taking these medications    Details   apixaban ANTICOAGULANT (ELIQUIS) 5 MG tablet Take 2 tablets (10 mg) by mouth 2 times daily for 7 days, THEN 1 tablet (5 mg) 2 times daily for 23 days.  Qty: 74 tablet, Refills: 0    Associated Diagnoses: Multiple subsegmental pulmonary emboli without acute cor pulmonale (H)           CONTINUE these medications which have NOT CHANGED    Details   FLUoxetine (PROZAC) 40 MG capsule Take 40 mg by mouth daily.      loratadine (CLARITIN) 10 MG tablet Take 10 mg by mouth 2 times daily as needed for allergies. Has been taking BID scheduled lately due to allergy flare      MAGNESIUM PO Take 1 tablet by mouth daily.      multivitamin, therapeutic (THERA-VIT) TABS tablet Take 1 tablet by mouth daily.      Turmeric (QC TUMERIC COMPLEX PO) Take 1 tablet by mouth daily.           STOP taking these medications       hydrochlorothiazide (HYDRODIURIL) 25 MG tablet Comments:   Reason for  Stopping:             Allergies   Allergies   Allergen Reactions    Sulfa Antibiotics Rash

## 2024-08-29 NOTE — PROGRESS NOTES
Observation goals  PRIOR TO DISCHARGE        Comments:   -diagnostic tests and consults completed and resulted- not met   -vital signs normal or at patient baseline- met   Nurse to notify provider when observation goals have been met and patient is ready for discharge.

## 2024-08-29 NOTE — PROGRESS NOTES
Observation goals  PRIOR TO DISCHARGE        Comments: -diagnostic tests and consults completed and resulted=Not Met  -vital signs normal or at patient baseline=Met  Nurse to notify provider when observation goals have been met and patient is ready for discharge.

## 2024-08-29 NOTE — PLAN OF CARE
PRIMARY Concern: Dyspnea on exertion, MARLI PE's  SAFETY RISK Concerns (fall risk, behaviors, etc.): none      Isolation/Type: n/a  Tests/Procedures for NEXT shift: AM labs and Heparin draw this morning  Consults? (Pending/following, signed-off?) n/a  Where is patient from? (Home, TCU, etc.): home  Other Important info for NEXT shift:   Anticipated DC date & active delays: TBD    SUMMARY NOTE:  Orientation/Cognitive: A&Ox4  Observation Goals (Met/ Not Met): Not Met  Mobility Level/Assist Equipment: Independent  Antibiotics & Plan (IV/po, length of tx left): n/a  Pain Management: denies pain  Tele/VS/O2: VSS on RA  ABNL Lab/BG: Heparin Xa 0.55  Diet: Tolerating a regular diet  Bowel/Bladder: WDL  Skin Concerns: WDL  Drains/Devices: PIV infusing heparin@1700units/hr  Patient Stated Goal for Today: rest     Observation goals  PRIOR TO DISCHARGE       Comments:   -diagnostic tests and consults completed and resulted: NOT MET,. AM labs today  -vital signs normal or at patient baseline: MET  Nurse to notify provider when observation goals have been met and patient is ready for discharge.

## 2024-08-29 NOTE — PLAN OF CARE
PRIMARY Concern: Dyspnea on exertion, MARLI PE's  SAFETY RISK Concerns (fall risk, behaviors, etc.): NA      Isolation/Type: NA  Tests/Procedures for NEXT shift: AM labs   Consults? (Pending/following, signed-off?) NA  Where is patient from? (Home, TCU, etc.): Home  Other Important info for NEXT shift: Transitioned from heparin to Eliquis. CT chest PE+  Anticipated DC date & active delays: 8/29    SUMMARY NOTE:  Orientation/Cognitive: A/Ox4  Observation Goals (Met/ Not Met): Not Met  Mobility Level/Assist Equipment: Ind  Antibiotics & Plan (IV/po, length of tx left): NA  Pain Management: Denies  Tele/VS/O2: VSS RA  ABNL Lab/BG: Cr 1.13, GFR 55  Diet: Reg  Bowel/Bladder: Cont  Skin Concerns: Intact  Drains/Devices: PIV SL  Patient Stated Goal for Today: Rest    VSS RA, no complaints of chest pain or SOB. PIV removed. AVS was gone through with patient, all questions and concerns answered. All belongings were taken with the patient, including discharge medications. Patient requested to walk down to her car for discharge.

## 2024-08-29 NOTE — PLAN OF CARE
Observation goals  PRIOR TO DISCHARGE       Comments:   -diagnostic tests and consults completed and resulted: NOT MET,. AM labs today  -vital signs normal or at patient baseline: MET  Nurse to notify provider when observation goals have been met and patient is ready for discharge.

## 2024-08-29 NOTE — PROGRESS NOTES
PRIMARY Concern: Dyspnea on exertion, MARLI PE's  SAFETY RISK Concerns (fall risk, behaviors, etc.): none      Isolation/Type: n/a  Tests/Procedures for NEXT shift: Heparin Xa @ 0005  Consults? (Pending/following, signed-off?) n/a  Where is patient from? (Home, TCU, etc.): home  Other Important info for NEXT shift:   Anticipated DC date & active delays: TBD  _____________________________________________________________________________  SUMMARY NOTE:  Orientation/Cognitive: A&Ox4  Observation Goals (Met/ Not Met): Not Met  Mobility Level/Assist Equipment: Independent  Antibiotics & Plan (IV/po, length of tx left): n/a  Pain Management: denies pain  Tele/VS/O2: pt mildly hypertensive, all other VSS on RA  ABNL Lab/BG: Heparin Xa=0.9  Diet: Tolerating a regular diet  Bowel/Bladder: WDL  Skin Concerns: WDL  Drains/Devices: PIV infusing heparin@1700units/hr  Patient Stated Goal for Today: rest       Observation goals  PRIOR TO DISCHARGE        Comments: -diagnostic tests and consults completed and resulted=Not Met  -vital signs normal or at patient baseline=Met  Nurse to notify provider when observation goals have been met and patient is ready for discharge

## 2025-04-07 ENCOUNTER — HOSPITAL ENCOUNTER (EMERGENCY)
Facility: CLINIC | Age: 60
Discharge: HOME | End: 2025-04-08
Attending: PHYSICIAN ASSISTANT | Admitting: PHYSICIAN ASSISTANT
Payer: COMMERCIAL

## 2025-04-07 ENCOUNTER — APPOINTMENT (OUTPATIENT)
Dept: GENERAL RADIOLOGY | Facility: CLINIC | Age: 60
End: 2025-04-07
Attending: PHYSICIAN ASSISTANT
Payer: COMMERCIAL

## 2025-04-07 ENCOUNTER — APPOINTMENT (OUTPATIENT)
Dept: ULTRASOUND IMAGING | Facility: CLINIC | Age: 60
End: 2025-04-07
Attending: PHYSICIAN ASSISTANT
Payer: COMMERCIAL

## 2025-04-07 VITALS
HEART RATE: 80 BPM | SYSTOLIC BLOOD PRESSURE: 120 MMHG | RESPIRATION RATE: 17 BRPM | TEMPERATURE: 98.4 F | HEIGHT: 66 IN | WEIGHT: 269 LBS | DIASTOLIC BLOOD PRESSURE: 51 MMHG | BODY MASS INDEX: 43.23 KG/M2 | OXYGEN SATURATION: 93 %

## 2025-04-07 DIAGNOSIS — M10.9 ACUTE GOUTY ARTHRITIS: ICD-10-CM

## 2025-04-07 PROBLEM — M71.21 BAKER'S CYST OF KNEE, RIGHT: Status: ACTIVE | Noted: 2024-09-04

## 2025-04-07 PROBLEM — R91.8 LUNG NODULES: Status: ACTIVE | Noted: 2024-09-04

## 2025-04-07 PROBLEM — N39.3 STRESS INCONTINENCE: Status: ACTIVE | Noted: 2018-04-06

## 2025-04-07 PROBLEM — R73.03 PREDIABETES: Status: ACTIVE | Noted: 2022-12-20

## 2025-04-07 LAB
ALBUMIN UR-MCNC: 10 MG/DL
ANION GAP SERPL CALCULATED.3IONS-SCNC: 9 MMOL/L (ref 7–15)
APPEARANCE FLD: ABNORMAL
APPEARANCE UR: CLEAR
BASOPHILS # BLD AUTO: 0 10E3/UL (ref 0–0.2)
BASOPHILS NFR BLD AUTO: 0 %
BILIRUB UR QL STRIP: NEGATIVE
BUN SERPL-MCNC: 16.5 MG/DL (ref 8–23)
CALCIUM SERPL-MCNC: 9.4 MG/DL (ref 8.8–10.4)
CHLORIDE SERPL-SCNC: 104 MMOL/L (ref 98–107)
COLOR FLD: ABNORMAL
COLOR UR AUTO: YELLOW
CREAT SERPL-MCNC: 1.17 MG/DL (ref 0.51–0.95)
CRP SERPL-MCNC: 8.78 MG/L
CRYSTALS SNV MICRO: ABNORMAL
EGFRCR SERPLBLD CKD-EPI 2021: 53 ML/MIN/1.73M2
EOSINOPHIL # BLD AUTO: 0.1 10E3/UL (ref 0–0.7)
EOSINOPHIL NFR BLD AUTO: 2 %
ERYTHROCYTE [DISTWIDTH] IN BLOOD BY AUTOMATED COUNT: 12.8 % (ref 10–15)
ERYTHROCYTE [SEDIMENTATION RATE] IN BLOOD BY WESTERGREN METHOD: 11 MM/HR (ref 0–30)
FLUAV RNA SPEC QL NAA+PROBE: NEGATIVE
FLUBV RNA RESP QL NAA+PROBE: NEGATIVE
GLUCOSE SERPL-MCNC: 106 MG/DL (ref 70–99)
GLUCOSE UR STRIP-MCNC: NEGATIVE MG/DL
HCO3 SERPL-SCNC: 28 MMOL/L (ref 22–29)
HCT VFR BLD AUTO: 42.4 % (ref 35–47)
HGB BLD-MCNC: 14.2 G/DL (ref 11.7–15.7)
HGB UR QL STRIP: NEGATIVE
HOLD SPECIMEN: NORMAL
IMM GRANULOCYTES # BLD: 0 10E3/UL
IMM GRANULOCYTES NFR BLD: 0 %
KETONES UR STRIP-MCNC: ABNORMAL MG/DL
LACTATE SERPL-SCNC: 1.1 MMOL/L (ref 0.7–2)
LEUKOCYTE ESTERASE UR QL STRIP: NEGATIVE
LYMPHOCYTES # BLD AUTO: 1.4 10E3/UL (ref 0.8–5.3)
LYMPHOCYTES NFR BLD AUTO: 15 %
LYMPHOCYTES NFR FLD MANUAL: 3 %
MCH RBC QN AUTO: 30.5 PG (ref 26.5–33)
MCHC RBC AUTO-ENTMCNC: 33.5 G/DL (ref 31.5–36.5)
MCV RBC AUTO: 91 FL (ref 78–100)
MONOCYTES # BLD AUTO: 0.6 10E3/UL (ref 0–1.3)
MONOCYTES NFR BLD AUTO: 7 %
MONOS+MACROS NFR FLD MANUAL: 2 %
NEUTROPHILS # BLD AUTO: 6.9 10E3/UL (ref 1.6–8.3)
NEUTROPHILS NFR BLD AUTO: 76 %
NEUTS BAND NFR FLD MANUAL: 95 %
NITRATE UR QL: NEGATIVE
NRBC # BLD AUTO: 0 10E3/UL
NRBC BLD AUTO-RTO: 0 /100
PH UR STRIP: 8 [PH] (ref 5–7)
PLATELET # BLD AUTO: 232 10E3/UL (ref 150–450)
POTASSIUM SERPL-SCNC: 4.6 MMOL/L (ref 3.4–5.3)
RBC # BLD AUTO: 4.66 10E6/UL (ref 3.8–5.2)
RBC URINE: 2 /HPF
RSV RNA SPEC NAA+PROBE: NEGATIVE
SARS-COV-2 RNA RESP QL NAA+PROBE: NEGATIVE
SODIUM SERPL-SCNC: 141 MMOL/L (ref 135–145)
SP GR UR STRIP: 1.03 (ref 1–1.03)
SPECIMEN SOURCE FLD: ABNORMAL
SQUAMOUS EPITHELIAL: 2 /HPF
URATE SERPL-MCNC: 8.6 MG/DL (ref 2.4–5.7)
UROBILINOGEN UR STRIP-MCNC: NORMAL MG/DL
WBC # BLD AUTO: 9.1 10E3/UL (ref 4–11)
WBC # FLD AUTO: ABNORMAL /UL
WBC URINE: 2 /HPF

## 2025-04-07 PROCEDURE — 87205 SMEAR GRAM STAIN: CPT | Performed by: STUDENT IN AN ORGANIZED HEALTH CARE EDUCATION/TRAINING PROGRAM

## 2025-04-07 PROCEDURE — 85652 RBC SED RATE AUTOMATED: CPT | Performed by: PHYSICIAN ASSISTANT

## 2025-04-07 PROCEDURE — 86140 C-REACTIVE PROTEIN: CPT | Performed by: PHYSICIAN ASSISTANT

## 2025-04-07 PROCEDURE — 20610 DRAIN/INJ JOINT/BURSA W/O US: CPT | Mod: RT

## 2025-04-07 PROCEDURE — 93971 EXTREMITY STUDY: CPT | Mod: RT

## 2025-04-07 PROCEDURE — 81003 URINALYSIS AUTO W/O SCOPE: CPT | Performed by: PHYSICIAN ASSISTANT

## 2025-04-07 PROCEDURE — 82310 ASSAY OF CALCIUM: CPT | Performed by: PHYSICIAN ASSISTANT

## 2025-04-07 PROCEDURE — 87070 CULTURE OTHR SPECIMN AEROBIC: CPT | Performed by: STUDENT IN AN ORGANIZED HEALTH CARE EDUCATION/TRAINING PROGRAM

## 2025-04-07 PROCEDURE — 73562 X-RAY EXAM OF KNEE 3: CPT | Mod: RT

## 2025-04-07 PROCEDURE — 83605 ASSAY OF LACTIC ACID: CPT | Performed by: PHYSICIAN ASSISTANT

## 2025-04-07 PROCEDURE — 84550 ASSAY OF BLOOD/URIC ACID: CPT | Performed by: PHYSICIAN ASSISTANT

## 2025-04-07 PROCEDURE — 89060 EXAM SYNOVIAL FLUID CRYSTALS: CPT | Performed by: STUDENT IN AN ORGANIZED HEALTH CARE EDUCATION/TRAINING PROGRAM

## 2025-04-07 PROCEDURE — 36415 COLL VENOUS BLD VENIPUNCTURE: CPT | Performed by: PHYSICIAN ASSISTANT

## 2025-04-07 PROCEDURE — 87040 BLOOD CULTURE FOR BACTERIA: CPT | Performed by: PHYSICIAN ASSISTANT

## 2025-04-07 PROCEDURE — 87637 SARSCOV2&INF A&B&RSV AMP PRB: CPT | Performed by: PHYSICIAN ASSISTANT

## 2025-04-07 PROCEDURE — 99284 EMERGENCY DEPT VISIT MOD MDM: CPT | Mod: 25

## 2025-04-07 PROCEDURE — 250N000013 HC RX MED GY IP 250 OP 250 PS 637: Performed by: PHYSICIAN ASSISTANT

## 2025-04-07 PROCEDURE — 89051 BODY FLUID CELL COUNT: CPT | Performed by: STUDENT IN AN ORGANIZED HEALTH CARE EDUCATION/TRAINING PROGRAM

## 2025-04-07 PROCEDURE — 250N000012 HC RX MED GY IP 250 OP 636 PS 637: Performed by: PHYSICIAN ASSISTANT

## 2025-04-07 PROCEDURE — 85025 COMPLETE CBC W/AUTO DIFF WBC: CPT | Performed by: PHYSICIAN ASSISTANT

## 2025-04-07 PROCEDURE — 80048 BASIC METABOLIC PNL TOTAL CA: CPT | Performed by: PHYSICIAN ASSISTANT

## 2025-04-07 RX ORDER — ACETAMINOPHEN 500 MG
1000 TABLET ORAL ONCE
Status: COMPLETED | OUTPATIENT
Start: 2025-04-07 | End: 2025-04-07

## 2025-04-07 RX ORDER — OXYCODONE HYDROCHLORIDE 5 MG/1
5 TABLET ORAL EVERY 6 HOURS PRN
Qty: 8 TABLET | Refills: 0 | Status: SHIPPED | OUTPATIENT
Start: 2025-04-07

## 2025-04-07 RX ORDER — INDOMETHACIN 25 MG/1
25 CAPSULE ORAL 2 TIMES DAILY WITH MEALS
Qty: 8 CAPSULE | Refills: 0 | Status: SHIPPED | OUTPATIENT
Start: 2025-04-07 | End: 2025-04-07

## 2025-04-07 RX ORDER — OXYCODONE HYDROCHLORIDE 5 MG/1
10 TABLET ORAL ONCE
Status: COMPLETED | OUTPATIENT
Start: 2025-04-07 | End: 2025-04-07

## 2025-04-07 RX ORDER — PREDNISONE 20 MG/1
20 TABLET ORAL ONCE
Status: COMPLETED | OUTPATIENT
Start: 2025-04-07 | End: 2025-04-07

## 2025-04-07 RX ORDER — PREDNISONE 20 MG/1
TABLET ORAL
Qty: 10 TABLET | Refills: 0 | Status: SHIPPED | OUTPATIENT
Start: 2025-04-07

## 2025-04-07 RX ORDER — OXYCODONE HYDROCHLORIDE 5 MG/1
5 TABLET ORAL EVERY 6 HOURS PRN
Qty: 8 TABLET | Refills: 0 | Status: SHIPPED | OUTPATIENT
Start: 2025-04-07 | End: 2025-04-07

## 2025-04-07 RX ADMIN — PREDNISONE 20 MG: 20 TABLET ORAL at 23:28

## 2025-04-07 RX ADMIN — OXYCODONE HYDROCHLORIDE 10 MG: 5 TABLET ORAL at 19:56

## 2025-04-07 RX ADMIN — ACETAMINOPHEN 1000 MG: 500 TABLET, FILM COATED ORAL at 19:56

## 2025-04-07 ASSESSMENT — ACTIVITIES OF DAILY LIVING (ADL)
ADLS_ACUITY_SCORE: 46

## 2025-04-07 ASSESSMENT — COLUMBIA-SUICIDE SEVERITY RATING SCALE - C-SSRS
1. IN THE PAST MONTH, HAVE YOU WISHED YOU WERE DEAD OR WISHED YOU COULD GO TO SLEEP AND NOT WAKE UP?: NO
2. HAVE YOU ACTUALLY HAD ANY THOUGHTS OF KILLING YOURSELF IN THE PAST MONTH?: NO
6. HAVE YOU EVER DONE ANYTHING, STARTED TO DO ANYTHING, OR PREPARED TO DO ANYTHING TO END YOUR LIFE?: NO

## 2025-04-07 NOTE — ED TRIAGE NOTES
Pt presents to triage with C/O leg pain, right side. Pt has Baker's cyst on back of right knee that she has been getting care for for the past year. Pt having new pain on top right knee/leg. Pt C/F blood clot, hx of clot. Pt has been having increased pain and decreased ROM. No meds PTA.      Triage Assessment (Adult)       Row Name 04/07/25 [id number]          Triage Assessment    Airway WDL WDL        Respiratory WDL    Respiratory WDL WDL        Skin Circulation/Temperature WDL    Skin Circulation/Temperature WDL WDL        Cardiac WDL    Cardiac WDL WDL        Peripheral/Neurovascular WDL    Peripheral Neurovascular WDL WDL        Cognitive/Neuro/Behavioral WDL    Cognitive/Neuro/Behavioral WDL WDL

## 2025-04-08 NOTE — ED NOTES
ED APC SUPERVISION NOTE:   I evaluated this patient in conjunction with Noe Cormier, ISAC  I have participated in the care of the patient and personally performed key elements of the history, exam, and medical decision making.      HPI:   Wendy Rutherford is a 60 year old female, anticoagulated (Eliquis), presenting to the emergency department for evaluation of knee pain. The patient reports she has been experiencing worsening right knee pain over the past 3 days. She states this pain is primarily over the anterior aspect of her knee and feels like pressure and stiffness. She notes that she has had a chronic Baker's cyst in her right knee and spoke with her primary care provider about this on 3/26/25. She states that during this visit, an X-Ray was obtained which showed a small effusion.  Patient was previously diagnosed with Gout of the right great toe, but never had an arthrocentesis before. She endorses increased swelling in her right knee, and denies any history of knee replacement. She endorses fever and chills today.    Independent Historian:   None    Review of External Notes: None      EXAM:   Pleasant female of stated age seated on stretcher.  She appears a little uncomfortable.  Right knee is notable for swelling, warmth, mild tenderness to palpation diffusely.  Pain is increased with active and passive range of motion of the right knee, though patient is able to fully range the joint.  There is no erythema.  There are no wounds.  Pulses are intact.  Brisk capillary refill.  Sensation is grossly intact to light touch.  Normal strength.    Independent Interpretation (X-rays, CTs, rhythm strip):  I independently interpreted the patient's right knee X-Ray, noting no fracture or dislocation.     Consultations/Discussion of Management or Tests:  None    Procedures:     Arthrocentesis     Procedure: Arthrocentesis    Indication: Joint Pain, Joint Swelling    Consent: Written from Patient     Universal  Protocol: Universal protocol was followed and time out conducted just prior to starting procedure, confirming patient identity, site/side, procedure, patient position, and availability of correct equipment and implants.     Location: Right Knee    Preparation: Betadine     Anesthesia/Sedation: Lidocaine - 1%    Procedure Detail: The site was prepped and draped in the usual fashion. A 18 gauge needle was used via the medial approach.  8 mL appearing joint fluid was withdrawn. The fluid was cloudy.    Patient Status: The patient tolerated the procedure well: Yes. There were no complications.     MIPS:      None    MEDICAL DECISION MAKING/ASSESSMENT AND PLAN:   Patient presenting with right knee pain and swelling.  Vital signs initially notable for borderline fever on arrival.  Considered differential including septic arthritis, crystal arthropathy such as gout or pseudogout, hemorrhagic effusion given patient is on apixaban, other infectious process, among others.  Ultimately, with workup unrevealing of cause of patient's borderline fever, we performed arthrocentesis, this was notable for crystals consistent with gout, along with around 30,000 nucleated cells, predominantly neutrophils.  I think is consistent with gout.  Overall I have extremely low suspicion for concomitant septic arthritis.  With patient being on apixaban, will prescribe a course of prednisone short course of narcotic pain medicine for breakthrough pain given she cannot take NSAIDs.      DIAGNOSIS:     ICD-10-CM    1. Acute gouty arthritis  M10.9             Scribe Disclosure:  I, Enrique Kong, am serving as a scribe at 9:20 PM on 4/7/2025 to document services personally performed by Adryan Recinos MD based on my observations and the provider's statements to me.   4/7/2025  [facility] EMERGENCY DEPT       Adryan Recinos MD  04/08/25 [id number]

## 2025-04-08 NOTE — ED NOTES
Bed: ED06  Expected date: 4/7/25  Expected time: 9:06 PM  Means of arrival:   Comments:   Held triage

## 2025-04-08 NOTE — ED PROVIDER NOTES
History     Chief Complaint:  Leg Pain (Right)       HPI   Wendy Rutherford is a 60 year old female reports with right knee pain swelling.  She has had knee pain for at least a week that was mild about a week ago.  She was seen by her primary doctor and x-ray obtained at that time as there think it was likely related to arthritis.  Visit with her primary doctor was 3/26/2025 with x-ray obtained 3/26/2025.  No evidence of fracture.  Patient reports the pain is gotten worse over the last few days over the weekend.  She reports she spiked a fever today.  No cough congestion sore throat abdominal pain dysuria hematuria.  No vomiting.  She reports she has a history of gout normally occurring in her right great toe.  She is never had gout in her right knee.  Pain in her knee has gotten worse where it hurts to stand and walk on her knee.  She denies any trauma to the knee.  No history of immunocompromise.  Not on chemotherapy not a drug user.  No recent trauma to her right knee.  She did have plans coming up to be seen by orthopedics to have her knee drained.  Notable she has on a blood thinner for history of DVT PEs.  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              Independent Historian:        Review of External Notes:  Office visit note 3/26/2025    Medications:    oxyCODONE (ROXICODONE) 5 MG tablet  FLUoxetine (PROZAC) 40 MG capsule  loratadine (CLARITIN) 10 MG tablet  MAGNESIUM PO  multivitamin, therapeutic (THERA-VIT) TABS tablet  predniSONE (DELTASONE) 20 MG tablet  Turmeric (QC TUMERIC COMPLEX PO)        Past Medical History:    No past medical history on file.    Past Surgical History:    No past surgical history on file.       Physical Exam   Patient Vitals for the past 24 hrs:   BP Temp Temp src Pulse Resp SpO2 Height Weight   04/07/25 2253 -- -- -- -- 17 93 % -- --   04/07/25 2222 -- -- -- -- -- 94 % -- --   04/07/25 2147 -- 98.4  F (36.9  C) -- -- -- -- -- --   04/07/25 2137 120/51 --  "-- -- -- 94 % -- --   04/07/25 1707 -- 100.2  F (37.9  C) Temporal -- -- -- 1.676 m (5' 6\") 122 kg (269 lb)   04/07/25 1706 (!) 161/83 -- -- 80 18 97 % -- --   04/07/25 1705 (!) 161/83 99.4  F (37.4  C) Temporal 96 16 98 % -- --        Physical Exam  Vitals and nursing note reviewed.   Constitutional:       Appearance: She is not diaphoretic.   Eyes:      General: No scleral icterus.     Conjunctiva/sclera: Conjunctivae normal.   Cardiovascular:      Rate and Rhythm: Normal rate and regular rhythm.   Pulmonary:      Effort: Pulmonary effort is normal.      Breath sounds: Normal breath sounds.   Musculoskeletal:      Right upper leg: No swelling or deformity.      Left upper leg: No swelling or deformity.      Right knee: Swelling (slight) present. No erythema. Decreased range of motion. Tenderness present over the medial joint line and lateral joint line. No LCL laxity, MCL laxity, ACL laxity or PCL laxity.      Left knee: No swelling or deformity. Normal range of motion. No tenderness. No medial joint line or lateral joint line tenderness. No LCL laxity, MCL laxity, ACL laxity or PCL laxity.     Right lower leg: No swelling, deformity or tenderness.      Left lower leg: No swelling, deformity or tenderness.      Right ankle: No swelling or deformity. No tenderness. Normal range of motion.      Left ankle: No swelling or deformity. No tenderness. Normal range of motion.      Right foot: Normal range of motion and normal capillary refill. No swelling or deformity. Normal pulse.   Skin:     Capillary Refill: Capillary refill takes less than 2 seconds.      Coloration: Skin is not jaundiced.      Findings: No bruising, erythema or rash.   Neurological:      Mental Status: She is alert and oriented to person, place, and time. Mental status is at baseline.   Psychiatric:         Mood and Affect: Mood normal.         Behavior: Behavior normal.         Thought Content: Thought content normal.         Emergency Department " Course     Imaging:  XR Knee Right 3 Views   Final Result   IMPRESSION: No acute displaced fracture or subluxation. Possible small knee joint effusion. Rounded density posterior to the compatible with known popliteal cyst.      US Lower Extremity Venous Duplex Right   Final Result   IMPRESSION:   1.  No deep venous thrombosis in the right lower extremity.          Laboratory:  Labs Ordered and Resulted from Time of ED Arrival to Time of ED Departure   BASIC METABOLIC PANEL - Abnormal       Result Value    Sodium 141      Potassium 4.6      Chloride 104      Carbon Dioxide (CO2) 28      Anion Gap 9      Urea Nitrogen 16.5      Creatinine 1.17 (*)     GFR Estimate 53 (*)     Calcium 9.4      Glucose 106 (*)    CRP INFLAMMATION - Abnormal    CRP Inflammation 8.78 (*)    ROUTINE UA WITH MICROSCOPIC REFLEX TO CULTURE - Abnormal    Color Urine Yellow      Appearance Urine Clear      Glucose Urine Negative      Bilirubin Urine Negative      Ketones Urine Trace (*)     Specific Gravity Urine 1.027      Blood Urine Negative      pH Urine 8.0 (*)     Protein Albumin Urine 10 (*)     Urobilinogen Urine Normal      Nitrite Urine Negative      Leukocyte Esterase Urine Negative      RBC Urine 2      WBC Urine 2      Squamous Epithelials Urine 2 (*)    URIC ACID - Abnormal    Uric Acid 8.6 (*)    CRYSTAL ID SYNOVIAL FLUID - Abnormal    Crystals Analysis   (*)     Value: Positive for intracellular crystals, consistent with monosodium urate crystals.   CELL COUNT BODY FLUID - Abnormal    Color Orange (*)     Clarity Turbid (*)     Cell Count Fluid Source Knee, Right      Total Nucleated Cells 33,630     ERYTHROCYTE SEDIMENTATION RATE AUTO - Normal    Erythrocyte Sedimentation Rate 11     INFLUENZA A/B, RSV AND SARS-COV2 PCR - Normal    Influenza A PCR Negative      Influenza B PCR Negative      RSV PCR Negative      SARS CoV2 PCR Negative     LACTIC ACID WHOLE BLOOD - Normal    Lactic Acid 1.1     CBC WITH PLATELETS AND DIFFERENTIAL     WBC Count 9.1      RBC Count 4.66      Hemoglobin 14.2      Hematocrit 42.4      MCV 91      MCH 30.5      MCHC 33.5      RDW 12.8      Platelet Count 232      % Neutrophils 76      % Lymphocytes 15      % Monocytes 7      % Eosinophils 2      % Basophils 0      % Immature Granulocytes 0      NRBCs per 100 WBC 0      Absolute Neutrophils 6.9      Absolute Lymphocytes 1.4      Absolute Monocytes 0.6      Absolute Eosinophils 0.1      Absolute Basophils 0.0      Absolute Immature Granulocytes 0.0      Absolute NRBCs 0.0     DIFERENTIAL BODY FLUID    % Neutrophils 95      % Lymphocytes 3      % Monocyte/Macrophages 2     BLOOD CULTURE   BLOOD CULTURE   CELL COUNT WITH DIFFERENTIAL FLUID        Procedures      Arthrocentesis      Procedure: Arthrocentesis     Indication: Joint Pain, Joint Swelling, and Fever     Consent: Written from Patient      Universal Protocol: Universal protocol was followed and time out conducted just prior to starting procedure, confirming patient identity, site/side, procedure, patient position, and availability of correct equipment and implants.      Location: Right Knee     Preparation: Betadine      Anesthesia/Sedation: Lidocaine - 1%     Procedure Detail: The site was prepped and draped in the usual fashion.  A 18 gauge needle was used via the medial approach.  8 mL appearing joint fluid was withdrawn. The fluid was cloudy.     Patient Status: The patient tolerated the procedure well: Yes. There were no complications.   Procedure performed both by Dr. Recinos and Noe Cormier PA-C    Emergency Department Course & Assessments:    Interventions:  Medications   oxyCODONE (ROXICODONE) tablet 10 mg (10 mg Oral $Given 4/7/25 1956)   acetaminophen (TYLENOL) tablet 1,000 mg (1,000 mg Oral $Given 4/7/25 1956)   predniSONE (DELTASONE) tablet 20 mg (20 mg Oral $Given 4/7/25 2328)      Independent Interpretation (X-rays, CTs, rhythm strip):  Right knee Xray: No fracture or  dislocation    Consultations/Discussion of Management or Tests:    ED Course as of 04/08/25 0955   Mon Apr 07, 2025 2125 Updated the patient on her lab test.  Patient reports that she is feeling better with the medication.       Social Drivers of Health affecting care:  None     Disposition:  The patient was discharged.    Impression & Plan    CMS Diagnoses: None       Medical Decision Making:    This is a 60 year old female that presents with worsen acute swelling and pain of her right knee. She did present with a small elevated temp of 100.2, but did not feel overtly febrile. Broad differential considered including but not limited to septic arthritis, gouty arthritis, fracture, DVT, referred pain. US imaging was negative for DVT. Xray imaging was negative. She is neurovascularly intact. She has normal WBC and inflammatory markers. Arthocentesis performed at bedside by Dr. Recinos and Noe Cormier PA-C. Cell testing is consistent with gout. No antibiotics or admission indicated at this time. Patient improved with interventions in the ED. She will be started on steroids as NSAIDS cannot be given do to DOAC usage. She should return if the develops worsening condition, increasing pain, fevers, redness of the skin . We recommend close primary care follow .    Diagnosis:    ICD-10-CM    1. Acute gouty arthritis  M10.9            Discharge Medications:  Discharge Medication List as of 4/7/2025 11:30 PM        START taking these medications    Details   predniSONE (DELTASONE) 20 MG tablet Take two tablets (= 40mg) each day for 5 (five) days, Disp-10 tablet, R-0, Local Print            4/7/2025   Noe Cormier PA-C Kruger, Jacob C, PA-C  04/08/25 1007

## 2025-04-08 NOTE — DISCHARGE INSTRUCTIONS
Please return if you develop worsening pain swelling redness of the joint high fever or worsening condition.

## 2025-04-10 LAB
BACTERIA BLD CULT: NORMAL
BACTERIA BLD CULT: NORMAL
BACTERIA SNV CULT: NORMAL
GRAM STAIN RESULT: NORMAL
GRAM STAIN RESULT: NORMAL

## 2025-04-12 LAB
BACTERIA BLD CULT: NO GROWTH
BACTERIA BLD CULT: NO GROWTH

## 2025-04-13 LAB
BACTERIA SNV CULT: NO GROWTH
GRAM STAIN RESULT: NORMAL
GRAM STAIN RESULT: NORMAL

## 2025-05-03 ENCOUNTER — HEALTH MAINTENANCE LETTER (OUTPATIENT)
Age: 60
End: 2025-05-03